# Patient Record
Sex: MALE | Race: OTHER | NOT HISPANIC OR LATINO | Employment: UNEMPLOYED | URBAN - METROPOLITAN AREA
[De-identification: names, ages, dates, MRNs, and addresses within clinical notes are randomized per-mention and may not be internally consistent; named-entity substitution may affect disease eponyms.]

---

## 2023-06-27 ENCOUNTER — OFFICE VISIT (OUTPATIENT)
Dept: INTERNAL MEDICINE | Facility: CLINIC | Age: 73
End: 2023-06-27

## 2023-06-27 VITALS
DIASTOLIC BLOOD PRESSURE: 76 MMHG | HEART RATE: 73 BPM | SYSTOLIC BLOOD PRESSURE: 136 MMHG | TEMPERATURE: 99 F | BODY MASS INDEX: 29.94 KG/M2 | HEIGHT: 62 IN | WEIGHT: 162.69 LBS

## 2023-06-27 DIAGNOSIS — N39.0 ACUTE UTI: ICD-10-CM

## 2023-06-27 DIAGNOSIS — I10 PRIMARY HYPERTENSION: Primary | ICD-10-CM

## 2023-06-27 DIAGNOSIS — Z72.0 TOBACCO USE: ICD-10-CM

## 2023-06-27 DIAGNOSIS — N13.8 BPH WITH OBSTRUCTION/LOWER URINARY TRACT SYMPTOMS: ICD-10-CM

## 2023-06-27 DIAGNOSIS — R30.0 DYSURIA: ICD-10-CM

## 2023-06-27 DIAGNOSIS — Z71.6 ENCOUNTER FOR TOBACCO USE CESSATION COUNSELING: ICD-10-CM

## 2023-06-27 DIAGNOSIS — N40.1 BPH WITH OBSTRUCTION/LOWER URINARY TRACT SYMPTOMS: ICD-10-CM

## 2023-06-27 PROCEDURE — 99204 PR OFFICE/OUTPT VISIT, NEW, LEVL IV, 45-59 MIN: ICD-10-PCS | Mod: S$GLB,,, | Performed by: INTERNAL MEDICINE

## 2023-06-27 PROCEDURE — 99204 OFFICE O/P NEW MOD 45 MIN: CPT | Mod: S$GLB,,, | Performed by: INTERNAL MEDICINE

## 2023-06-27 RX ORDER — IBUPROFEN 600 MG/1
600 TABLET ORAL EVERY 6 HOURS PRN
Qty: 100 TABLET | Refills: 0 | Status: SHIPPED | OUTPATIENT
Start: 2023-06-27

## 2023-06-27 RX ORDER — NITROFURANTOIN 25; 75 MG/1; MG/1
100 CAPSULE ORAL 2 TIMES DAILY
Qty: 20 CAPSULE | Refills: 0 | Status: SHIPPED | OUTPATIENT
Start: 2023-06-27

## 2023-06-27 RX ORDER — DUTASTERIDE AND TAMSULOSIN HYDROCHLORIDE CAPSULES .5; .4 MG/1; MG/1
1 CAPSULE ORAL DAILY
Qty: 90 CAPSULE | Refills: 1 | Status: SHIPPED | OUTPATIENT
Start: 2023-06-27 | End: 2024-06-26

## 2023-06-27 NOTE — PROGRESS NOTES
Chief C/o:    Urinary Tract Infection and Prostate Problem (Patient is having dysuria, difficulty in urination, small amount of urine coming, and severe pain when he urinates, this started about 9 days ago, he denies terry hematuria, denies fever, denies chills.)        Health Care Maintenance    Health Maintenance         Date Due Completion Date    Hepatitis C Screening Never done ---    Lipid Panel Never done ---    COVID-19 Vaccine (1) Never done ---    Pneumococcal Vaccines (Age 65+) (1 - PCV) Never done ---    TETANUS VACCINE Never done ---    Colorectal Cancer Screening Never done ---    Shingles Vaccine (1 of 2) Never done ---    Abdominal Aortic Aneurysm Screening Never done ---    Influenza Vaccine (Season Ended) 09/01/2023 ---                   HISTORY OF PRESENT ILLNESS:    ZULMA Murphy is a 72 y.o. male who presents to the clinic today for Urinary Tract Infection and Prostate Problem (Patient is having dysuria, difficulty in urination, small amount of urine coming, and severe pain when he urinates, this started about 9 days ago, he denies terry hematuria, denies fever, denies chills.)  .   Patient had history of hypertension, he does not know what medication he takes.  He is chronic heavy smoker.    [place].                ALLERGIES AND MEDICATIONS: updated and reviewed.  Review of patient's allergies indicates:  No Known Allergies      Problem List:  Patient Active Problem List   Diagnosis    Primary hypertension    BMI 29.0-29.9,adult    Tobacco use    Encounter for tobacco use cessation counseling    Dysuria    BPH with obstruction/lower urinary tract symptoms         CARE TEAM:    Patient Care Team:  Anju Abdalla MD as PCP - General (Internal Medicine)         REVIEW OF SYSTEMS:    Review of Systems   Constitutional:  Negative for appetite change, chills, diaphoresis, fatigue, fever and unexpected weight change.   HENT:  Negative for  "congestion, drooling, ear discharge, ear pain, facial swelling, hearing loss, nosebleeds, rhinorrhea, sinus pain, sneezing, sore throat, tinnitus, trouble swallowing and voice change.    Eyes:  Negative for pain, discharge, redness, itching and visual disturbance.   Respiratory:  Negative for cough, choking, chest tightness, shortness of breath, wheezing and stridor.    Cardiovascular:  Negative for chest pain, palpitations and leg swelling.   Gastrointestinal:  Negative for abdominal distention, abdominal pain, blood in stool, constipation, diarrhea, nausea and vomiting.   Endocrine: Negative for cold intolerance, heat intolerance, polydipsia, polyphagia and polyuria.   Genitourinary:  Positive for difficulty urinating, dysuria, frequency and urgency. Negative for flank pain and hematuria.   Musculoskeletal:  Negative for arthralgias, back pain, gait problem, joint swelling, myalgias, neck pain and neck stiffness.   Skin:  Negative for color change, pallor, rash and wound.   Allergic/Immunologic: Negative for environmental allergies, food allergies and immunocompromised state.   Neurological:  Negative for dizziness, tremors, seizures, syncope, speech difficulty, weakness, light-headedness, numbness and headaches.   Hematological:  Negative for adenopathy. Does not bruise/bleed easily.   Psychiatric/Behavioral:  Negative for agitation, behavioral problems, confusion, decreased concentration, dysphoric mood, hallucinations, sleep disturbance and suicidal ideas. The patient is not nervous/anxious.        PHYSICAL EXAM:    Vitals:    06/27/23 0943   BP: 136/76   Pulse: 73   Temp: 98.6 °F (37 °C)     Weight: 73.8 kg (162 lb 11.2 oz)   Height: 5' 2.21" (158 cm)   Body mass index is 29.56 kg/m².  Vitals:    06/27/23 0943   BP: 136/76   Pulse: 73   Temp: 98.6 °F (37 °C)   TempSrc: Temporal   Weight: 73.8 kg (162 lb 11.2 oz)   Height: 5' 2.21" (1.58 m)   PainSc:   8   PainLoc: Rectum          Physical Exam  Constitutional: "       General: He is not in acute distress.     Appearance: Normal appearance. He is not ill-appearing, toxic-appearing or diaphoretic.   HENT:      Head: Normocephalic and atraumatic.      Right Ear: Tympanic membrane, ear canal and external ear normal. There is no impacted cerumen.      Left Ear: Tympanic membrane, ear canal and external ear normal. There is no impacted cerumen.      Nose: Nose normal. No congestion or rhinorrhea.      Mouth/Throat:      Mouth: Mucous membranes are moist.      Pharynx: Oropharynx is clear. No oropharyngeal exudate or posterior oropharyngeal erythema.   Eyes:      General: No scleral icterus.        Right eye: No discharge.         Left eye: No discharge.      Extraocular Movements: Extraocular movements intact.      Conjunctiva/sclera: Conjunctivae normal.      Pupils: Pupils are equal, round, and reactive to light.   Cardiovascular:      Rate and Rhythm: Normal rate and regular rhythm.      Pulses: Normal pulses.      Heart sounds: Normal heart sounds. No murmur heard.    No friction rub. No gallop.   Pulmonary:      Effort: Pulmonary effort is normal. No respiratory distress.      Breath sounds: Normal breath sounds. No stridor. No wheezing, rhonchi or rales.   Chest:      Chest wall: No tenderness.   Abdominal:      General: Bowel sounds are normal. There is no distension.      Palpations: Abdomen is soft. There is no mass.      Tenderness: There is no abdominal tenderness. There is no guarding or rebound.      Hernia: No hernia is present.   Musculoskeletal:         General: No swelling, tenderness, deformity or signs of injury. Normal range of motion.      Cervical back: Normal range of motion and neck supple. No rigidity.      Right lower leg: No edema.      Left lower leg: No edema.   Lymphadenopathy:      Cervical: No cervical adenopathy.   Skin:     General: Skin is warm and dry.      Capillary Refill: Capillary refill takes less than 2 seconds.      Coloration: Skin is  not jaundiced or pale.      Findings: No bruising, erythema, lesion or rash.   Neurological:      General: No focal deficit present.      Mental Status: He is alert and oriented to person, place, and time.      Cranial Nerves: No cranial nerve deficit.      Sensory: No sensory deficit.      Motor: No weakness.      Coordination: Coordination normal.      Deep Tendon Reflexes: Reflexes normal.   Psychiatric:         Behavior: Behavior normal.         Thought Content: Thought content normal.         Judgment: Judgment normal.      Comments: anxious          Labs:    No results found for: GLU, NA, K, CL, CO2, BUN, CREATININE, CALCIUM, PROT, ALBUMIN, BILITOT, ALKPHOS, AST, ALT, ANIONGAP, ESTGFRAFRICA, EGFRNONAA  No results found for: WBC, RBC, HGB, HCT, MCV, RDW, PLT   No results found for: CHOL, TRIG, HDL, LDLCALC, TOTALCHOLEST  No results found for: TSH  No results found for: HGBA1C, ESTIMATEDAVG   No components found for: MICROALBUMIN/CREATININE    ASSESSMENT & PLAN:    1. Primary hypertension    2. BPH with obstruction/lower urinary tract symptoms  - dutasteride-tamsulosin 0.5-0.4 mg CM24; Take 1 tablet by mouth once daily.  Dispense: 90 capsule; Refill: 1  - nitrofurantoin, macrocrystal-monohydrate, (MACROBID) 100 MG capsule; Take 1 capsule (100 mg total) by mouth 2 (two) times daily.  Dispense: 20 capsule; Refill: 0  - ibuprofen (ADVIL,MOTRIN) 600 MG tablet; Take 1 tablet (600 mg total) by mouth every 6 (six) hours as needed for Pain.  Dispense: 100 tablet; Refill: 0    3. Dysuria  - ibuprofen (ADVIL,MOTRIN) 600 MG tablet; Take 1 tablet (600 mg total) by mouth every 6 (six) hours as needed for Pain.  Dispense: 100 tablet; Refill: 0    4. BMI 29.0-29.9,adult    5. Tobacco use    6. Encounter for tobacco use cessation counseling    7. Acute UTI  - nitrofurantoin, macrocrystal-monohydrate, (MACROBID) 100 MG capsule; Take 1 capsule (100 mg total) by mouth 2 (two) times daily.  Dispense: 20 capsule; Refill: 0        Patient presenting with symptoms consistent with her tract infection and probably acute prostatitis, he was not able to give urine samples, was advised to do urine test and culture if possible, medication as above, was advised to go to emergency room for any distress or call us.  Patient is having hypertension history in his blood pressure is somewhat on the high side, he was advised to continue his medications, low-salt diet and to follow up with his doctor when [place] go [place].    And exercise were recommended as well.  Quitting smoking was advised as well.    No orders of the defined types were placed in this encounter.     No follow-ups on file. or sooner as needed.    Patient was counseled and questions and concerns were addressed.    Please note:  Parts of this report were done using a dictation software, voice to text, and sometimes the text contains some uncorrected words or sentences that are missed during revision.